# Patient Record
Sex: FEMALE | Race: WHITE | URBAN - METROPOLITAN AREA
[De-identification: names, ages, dates, MRNs, and addresses within clinical notes are randomized per-mention and may not be internally consistent; named-entity substitution may affect disease eponyms.]

---

## 2017-01-30 ENCOUNTER — TRANSFERRED RECORDS (OUTPATIENT)
Dept: HEALTH INFORMATION MANAGEMENT | Facility: CLINIC | Age: 72
End: 2017-01-30

## 2017-08-24 ENCOUNTER — TRANSFERRED RECORDS (OUTPATIENT)
Dept: HEALTH INFORMATION MANAGEMENT | Facility: CLINIC | Age: 72
End: 2017-08-24

## 2017-09-26 ENCOUNTER — PRE VISIT (OUTPATIENT)
Dept: OTOLARYNGOLOGY | Facility: CLINIC | Age: 72
End: 2017-09-26

## 2017-09-26 NOTE — TELEPHONE ENCOUNTER
1.  Date/reason for appt: 10/5/17 -- schwannoma    2.  Referring provider: Larry Su    3.  Call to patient (Yes / No - short description): no, referred    4.  Previous care at / records requested from: [facility] -- per appt notes, records received.  Need imaging. -- faxed imaging request

## 2017-09-27 NOTE — TELEPHONE ENCOUNTER
Radiology reports received from [facility]. Images pushed.  MR orbit face and neck 8/24/17  CT head 1/30/17

## 2017-09-29 ENCOUNTER — TELEPHONE (OUTPATIENT)
Dept: OTOLARYNGOLOGY | Facility: CLINIC | Age: 72
End: 2017-09-29

## 2017-09-29 NOTE — TELEPHONE ENCOUNTER
Dr. Blanchard has reviewed pt MRI. Because of the location of pt mass- right temporal, pt should see a head/neck physician not a neurologist. Please call ENT  Madeleine to coordinate new appointment.  The above message left on Francheska Quesada's voice mail.

## 2017-10-09 ENCOUNTER — OFFICE VISIT (OUTPATIENT)
Dept: OTOLARYNGOLOGY | Facility: CLINIC | Age: 72
End: 2017-10-09

## 2017-10-09 DIAGNOSIS — D36.10 SCHWANNOMA: Primary | ICD-10-CM

## 2017-10-09 RX ORDER — DIVALPROEX SODIUM 500 MG/1
TABLET, EXTENDED RELEASE ORAL
Status: ON HOLD | COMMUNITY
Start: 1994-01-01 | End: 2017-11-16

## 2017-10-09 RX ORDER — MONTELUKAST SODIUM 10 MG/1
TABLET ORAL
COMMUNITY
Start: 2007-01-01

## 2017-10-09 RX ORDER — DIVALPROEX SODIUM 250 MG/1
TABLET, DELAYED RELEASE ORAL
COMMUNITY
Start: 2010-01-01

## 2017-10-09 RX ORDER — DILTIAZEM HCL 60 MG
120 TABLET ORAL DAILY
COMMUNITY
Start: 2007-01-01

## 2017-10-09 RX ORDER — GABAPENTIN 300 MG/1
900 CAPSULE ORAL AT BEDTIME
COMMUNITY
Start: 1994-01-01

## 2017-10-09 RX ORDER — MECLIZINE HCL 25MG 25 MG/1
TABLET, CHEWABLE ORAL
COMMUNITY
Start: 2015-01-01

## 2017-10-09 RX ORDER — GLIPIZIDE 10 MG/1
10 TABLET, FILM COATED, EXTENDED RELEASE ORAL DAILY
COMMUNITY
Start: 2000-01-01

## 2017-10-09 RX ORDER — METFORMIN HCL 500 MG
1000 TABLET, EXTENDED RELEASE 24 HR ORAL 2 TIMES DAILY WITH MEALS
COMMUNITY
Start: 1998-01-01

## 2017-10-09 RX ORDER — ATORVASTATIN CALCIUM 10 MG/1
TABLET, FILM COATED ORAL
COMMUNITY
Start: 1999-01-01

## 2017-10-09 RX ORDER — LORAZEPAM 0.5 MG/1
TABLET ORAL
COMMUNITY
Start: 2000-01-01

## 2017-10-09 RX ORDER — SERTRALINE HYDROCHLORIDE 100 MG/1
200 TABLET, FILM COATED ORAL DAILY
COMMUNITY
Start: 2000-01-01

## 2017-10-09 RX ORDER — OXYBUTYNIN CHLORIDE 10 MG/1
TABLET, EXTENDED RELEASE ORAL
COMMUNITY
Start: 2007-01-01

## 2017-10-09 RX ORDER — LOSARTAN POTASSIUM AND HYDROCHLOROTHIAZIDE 25; 100 MG/1; MG/1
TABLET ORAL
COMMUNITY
Start: 2000-01-01

## 2017-10-09 ASSESSMENT — PAIN SCALES - GENERAL: PAINLEVEL: NO PAIN (0)

## 2017-10-09 NOTE — PATIENT INSTRUCTIONS
Tumor board on Friday  You will be called with results and recommendations  Call me if ?'s arise [phone]  Kerry Haney RN

## 2017-10-09 NOTE — MR AVS SNAPSHOT
After Visit Summary   10/9/2017    Clarisa Bella    MRN: [id number]           Patient Information     Date Of Birth          1945        Visit Information        Provider Department      10/9/2017 8:30 AM Abdiel Akhtar MD [facility] Ear Nose and Throat        Today's Diagnoses     Schwannoma    -  1      Care Instructions    Tumor board on Friday  You will be called with results and recommendations  Call me if ?'s arise [phone]  Kerry Haney RN          Follow-ups after your visit        Who to contact     Please call your clinic at [phone] to:    Ask questions about your health    Make or cancel appointments    Discuss your medicines    Learn about your test results    Speak to your doctor   If you have compliments or concerns about an experience at your clinic, or if you wish to file a complaint, please contact [facility] Patient Relations at [phone] or email us at [email or web address]         Additional Information About Your Visit        Critical Linkshart Information     [employer] gives you secure access to your electronic health record. If you see a primary care provider, you can also send messages to your care team and make appointments. If you have questions, please call your primary care clinic.  If you do not have a primary care provider, please call [phone] and they will assist you.      [employer] is an electronic gateway that provides easy, online access to your medical records. With [employer], you can request a clinic appointment, read your test results, renew a prescription or communicate with your care team.     To access your existing account, please contact your [facility] or call [phone] for assistance.        Care EveryWhere ID     This is your Care EveryWhere ID. This could be used by other organizations to access your [place] medical records  [id number]         Blood Pressure from Last 3  Encounters:   No data found for BP    Weight from Last 3 Encounters:   No data found for Wt              We Performed the Following     Eryn-Operative Worksheet (Head & Neck)        Primary Care Provider    None Specified       No primary provider on file.        Equal Access to Services     ANJALI AYALA : Hadkacey juan estrada erik Rosario, waashlynda lucarmen, qaybta kayamelda michele, cynthia landerosevonne russell. So [facility] [phone].    ATENCIÓN: Si habla español, tiene a rao disposición servicios gratuitos de asistencia lingüística. Llame al [phone].    We comply with applicable federal civil rights laws and [place] laws. We do not discriminate on the basis of race, color, national origin, age, disability, sex, sexual orientation, or gender identity.            Thank you!     Thank you for choosing [facility] EAR NOSE AND THROAT  for your care. Our goal is always to provide you with excellent care. Hearing back from our patients is one way we can continue to improve our services. Please take a few minutes to complete the written survey that you may receive in the mail after your visit with us. Thank you!             Your Updated Medication List - Protect others around you: Learn how to safely use, store and throw away your medicines at www.disposemymeds.org.          This list is accurate as of: 10/9/17 11:59 PM.  Always use your most recent med list.                   Brand Name Dispense Instructions for use Diagnosis    atorvastatin 10 MG tablet    LIPITOR          BYDUREON 2 MG pen   Generic drug:  exenatide ER           canagliflozin 300 MG tablet    INVOKANA          diltiazem 60 MG tablet    CARDIZEM          * divalproex 500 MG 24 hr tablet    DEPAKOTE ER          * divalproex 250 MG EC tablet    DEPAKOTE          glipiZIDE XL 10 MG 24 hr tablet   Generic drug:  glipiZIDE           LORazepam 0.5 MG tablet    ATIVAN          losartan-hydrochlorothiazide 100-25 MG per tablet    HYZAAR           meclizine 25 MG Chew           metFORMIN 500 MG 24 hr tablet    GLUCOPHAGE-XR          montelukast 10 MG tablet    SINGULAIR          NEURONTIN 300 MG capsule   Generic drug:  gabapentin           oxybutynin 10 MG 24 hr tablet    DITROPAN-XL          sertraline 100 MG tablet    ZOLOFT          * Notice:  This list has 2 medication(s) that are the same as other medications prescribed for you. Read the directions carefully, and ask your doctor or other care provider to review them with you.

## 2017-10-09 NOTE — LETTER
"10/9/2017       RE: Clarisa Bella  [address]  [facility] [address]     Dear Colleague,    Thank you for referring your patient, Clarisa Bella, to the [facility] EAR NOSE AND THROAT at [facility]. Please see a copy of my visit note below.    October 9, 2017         Reji Su MD   [facility]   [address] [place]   [address]      Dear Dr. Su:      Thank you for requesting consultation on Clarisa Bella.      HISTORY OF PRESENT ILLNESS:  As you know, she is a woman who noticed swelling in her right temporal area over the last year or so.  Initial needle biopsy was nondiagnostic and then she underwent open biopsy which revealed a schwannoma.  MRI imaging reveals it is lateral to the lateral orbital wall sitting in the space deep to the temporalis muscle.  The patient does have a general ache in that area, and it makes it a little difficult for her to put her glasses on because of the bulge, but she denies any numbness of her face, forehead, tongue, lips or cheek.  She has no dry eye either.  It is therefore not exactly clear which nerve this is emanating from.  She also has no facial weakness.             Past Medical History:   Diagnosis Date     Benign positional vertigo 2015     Cerebral infarction (H) 1998    small stroke during back fusion surgery due to blood loss     Diabetes (H) 1997     Hypertension 1997     Problem, psychiatric 1988    Bi Polar, PTSD     Skin disease 1997    Viteligo     Uncomplicated asthma LIfetime     Past Surgical History:   Procedure Laterality Date     GENITOURINARY SURGERY  1999    Bladder \"re-strung\" @ same time as Hysterectomy     GYN SURGERY  1999    Hysterectomy     ORTHOPEDIC SURGERY  1997    Back Fusion of L4 & L5       Current Outpatient Prescriptions:      atorvastatin (LIPITOR) 10 MG tablet, , Disp: , Rfl:      exenatide ER (BYDUREON) 2 MG pen, , Disp: , Rfl:      canagliflozin (INVOKANA) 300 MG tablet, , " Disp: , Rfl:      diltiazem (CARDIZEM) 60 MG tablet, , Disp: , Rfl:      divalproex (DEPAKOTE) 250 MG EC tablet, , Disp: , Rfl:      divalproex (DEPAKOTE ER) 500 MG 24 hr tablet, , Disp: , Rfl:      glipiZIDE (GLIPIZIDE XL) 10 MG 24 hr tablet, , Disp: , Rfl:      LORazepam (ATIVAN) 0.5 MG tablet, , Disp: , Rfl:      losartan-hydrochlorothiazide (HYZAAR) 100-25 MG per tablet, , Disp: , Rfl:      meclizine 25 MG CHEW, , Disp: , Rfl:      metFORMIN (GLUCOPHAGE-XR) 500 MG 24 hr tablet, , Disp: , Rfl:      montelukast (SINGULAIR) 10 MG tablet, , Disp: , Rfl:      gabapentin (NEURONTIN) 300 MG capsule, , Disp: , Rfl:      oxybutynin (DITROPAN-XL) 10 MG 24 hr tablet, , Disp: , Rfl:      sertraline (ZOLOFT) 100 MG tablet, , Disp: , Rfl:   No Known Allergies  Social History   Substance Use Topics     Smoking status: Never Smoker     Smokeless tobacco: Never Used     Alcohol use No     Review Of Systems  Skin: negative  Eyes: negative  Ears/Nose/Throat: negative  Respiratory: No shortness of breath, dyspnea on exertion, cough, or hemoptysis  Cardiovascular: negative  Gastrointestinal: negative  Genitourinary: negative  Musculoskeletal: negative  Neurologic: negative  Psychiatric: negative  Hematologic/Lymphatic/Immunologic: negative  Endocrine: negative    PHYSICAL EXAMINATION:  She is well appearing and in no distress.  Examination of the oral cavity reveals normal mucosa of the tongue, floor of mouth, hard and soft palate, gingival and buccal mucosa, retromolar trigone and posterior pharyngeal wall.  Palpation of floor of mouth, tongue and base of tongue are negative.  She cannot tolerate mirror exam.  Examination of the face and neck reveals a soft tissue swelling between the zygoma and the lateral orbital wall.  She has no facial nerve abnormalities.  Her facial nerve is entirely normal throughout.  Her sensation in V1, V2, V3 seems to be entirely normal.  Her extraocular motion is normal as well.  The mass itself reveals  a scar from the prior biopsy, and there is also another scar in the preauricular area which appears to have been from some trauma years ago.      IMAGING:  The patient has a CT scan and MRI that shows a well-circumscribed mass in the temporal fossa deep to the temporalis muscle abutting the skull base and lateral orbital wall.      IMPRESSION:  Right temporal fossa schwannoma.      PLAN:   1.  I would like to have the slides reviewed here.   2.  I would like to review her scans at our Tumor Conference.   3.  I discussed excision with the patient which would involve a hairline incision coming down into the preauricular area.  She understands that she would need facial nerve monitoring, and the branches to the forehead and the eye are certainly at risk.  We will do our best to save these but given that the mass is near some of the very distal aspects of the nerve, there certainly is risk to these distal branches.  She understands that if that was to happen, she could even require a gold weight for eyelid closure if this was not functioning after surgery.  She wishes to proceed.  All risks were discussed with the patient which include but are not limited to bleeding, infection and facial nerve injury as well as numbness in the face.      Sincerely,      Abdiel Akhtar M.D.        Otolaryngology-Head & Neck Surgery    [phone]

## 2017-10-09 NOTE — NURSING NOTE
Relevant Diagnosis:mass   Teaching Topic: exc right temporal fossa mass      Person(s) involved in teaching:  Patient     Teaching Concerns Addressed:  Pre op teaching included the need for an H&P, NPO status pre op, hospital routines, expected recovery, activity  restrictions, antimicrobial scrub, s/s of infection, pain control methods and the importance of follow up appointments.  The patient voiced an understanding of all instructions and will call with questions.     Motivation Level:  Asks Questions:   Yes  Eager to Learn:   Yes  Cooperative:   Yes  Receptive (willing/able to accept information):   Yes     Patient  demonstrates understanding of the following:  Reason for the appointment, diagnosis and treatment plan:   Yes  Knowledge of proper use of medications and conditions for which they are ordered (with special attention to potential side effects or drug interactions):   Yes  Which situations necessitate calling provider and whom to contact:   Yes        Proper use and care of  (medical equip, care aids, etc.):   NA  Nutritional needs and diet plan:   Yes  Pain management techniques:   Yes  Patient instructed on hand hygiene:  Yes  How and/when to access community resources:   NA     Infection Prevention:  Patient   demonstrates understanding of the following:  Surgical procedure site care taught   Signs and symptoms of infection taught Yes  Wound care taught Yes     Instructional Materials Used/Given: Pre op booklet.

## 2017-10-09 NOTE — PROGRESS NOTES
"October 9, 2017         Reji Su MD   [facility]   [address] [place]   [address]      Dear Dr. Su:      Thank you for requesting consultation on Clarisa Bella.      HISTORY OF PRESENT ILLNESS:  As you know, she is a woman who noticed swelling in her right temporal area over the last year or so.  Initial needle biopsy was nondiagnostic and then she underwent open biopsy which revealed a schwannoma.  MRI imaging reveals it is lateral to the lateral orbital wall sitting in the space deep to the temporalis muscle.  The patient does have a general ache in that area, and it makes it a little difficult for her to put her glasses on because of the bulge, but she denies any numbness of her face, forehead, tongue, lips or cheek.  She has no dry eye either.  It is therefore not exactly clear which nerve this is emanating from.  She also has no facial weakness.             Past Medical History:   Diagnosis Date     Benign positional vertigo 2015     Cerebral infarction (H) 1998    small stroke during back fusion surgery due to blood loss     Diabetes (H) 1997     Hypertension 1997     Problem, psychiatric 1988    Bi Polar, PTSD     Skin disease 1997    Viteligo     Uncomplicated asthma LIfetime     Past Surgical History:   Procedure Laterality Date     GENITOURINARY SURGERY  1999    Bladder \"re-strung\" @ same time as Hysterectomy     GYN SURGERY  1999    Hysterectomy     ORTHOPEDIC SURGERY  1997    Back Fusion of L4 & L5       Current Outpatient Prescriptions:      atorvastatin (LIPITOR) 10 MG tablet, , Disp: , Rfl:      exenatide ER (BYDUREON) 2 MG pen, , Disp: , Rfl:      canagliflozin (INVOKANA) 300 MG tablet, , Disp: , Rfl:      diltiazem (CARDIZEM) 60 MG tablet, , Disp: , Rfl:      divalproex (DEPAKOTE) 250 MG EC tablet, , Disp: , Rfl:      divalproex (DEPAKOTE ER) 500 MG 24 hr tablet, , Disp: , Rfl:      glipiZIDE (GLIPIZIDE XL) 10 MG 24 hr tablet, , Disp: , Rfl:      LORazepam (ATIVAN) " 0.5 MG tablet, , Disp: , Rfl:      losartan-hydrochlorothiazide (HYZAAR) 100-25 MG per tablet, , Disp: , Rfl:      meclizine 25 MG CHEW, , Disp: , Rfl:      metFORMIN (GLUCOPHAGE-XR) 500 MG 24 hr tablet, , Disp: , Rfl:      montelukast (SINGULAIR) 10 MG tablet, , Disp: , Rfl:      gabapentin (NEURONTIN) 300 MG capsule, , Disp: , Rfl:      oxybutynin (DITROPAN-XL) 10 MG 24 hr tablet, , Disp: , Rfl:      sertraline (ZOLOFT) 100 MG tablet, , Disp: , Rfl:   No Known Allergies  Social History   Substance Use Topics     Smoking status: Never Smoker     Smokeless tobacco: Never Used     Alcohol use No     Review Of Systems  Skin: negative  Eyes: negative  Ears/Nose/Throat: negative  Respiratory: No shortness of breath, dyspnea on exertion, cough, or hemoptysis  Cardiovascular: negative  Gastrointestinal: negative  Genitourinary: negative  Musculoskeletal: negative  Neurologic: negative  Psychiatric: negative  Hematologic/Lymphatic/Immunologic: negative  Endocrine: negative    PHYSICAL EXAMINATION:  She is well appearing and in no distress.  Examination of the oral cavity reveals normal mucosa of the tongue, floor of mouth, hard and soft palate, gingival and buccal mucosa, retromolar trigone and posterior pharyngeal wall.  Palpation of floor of mouth, tongue and base of tongue are negative.  She cannot tolerate mirror exam.  Examination of the face and neck reveals a soft tissue swelling between the zygoma and the lateral orbital wall.  She has no facial nerve abnormalities.  Her facial nerve is entirely normal throughout.  Her sensation in V1, V2, V3 seems to be entirely normal.  Her extraocular motion is normal as well.  The mass itself reveals a scar from the prior biopsy, and there is also another scar in the preauricular area which appears to have been from some trauma years ago.      IMAGING:  The patient has a CT scan and MRI that shows a well-circumscribed mass in the temporal fossa deep to the temporalis muscle  abutting the skull base and lateral orbital wall.      IMPRESSION:  Right temporal fossa schwannoma.      PLAN:   1.  I would like to have the slides reviewed here.   2.  I would like to review her scans at our Tumor Conference.   3.  I discussed excision with the patient which would involve a hairline incision coming down into the preauricular area.  She understands that she would need facial nerve monitoring, and the branches to the forehead and the eye are certainly at risk.  We will do our best to save these but given that the mass is near some of the very distal aspects of the nerve, there certainly is risk to these distal branches.  She understands that if that was to happen, she could even require a gold weight for eyelid closure if this was not functioning after surgery.  She wishes to proceed.  All risks were discussed with the patient which include but are not limited to bleeding, infection and facial nerve injury as well as numbness in the face.      Sincerely,      Abdiel Akhtar M.D.        Otolaryngology-Head & Neck Surgery    [phone]

## 2017-10-09 NOTE — NURSING NOTE
Chief Complaint   Patient presents with     Consult     soft tissue mass right temple . Unable to obtain height and weight. Wheel chair bound     Christiano Grey LPN

## 2017-11-15 ENCOUNTER — ANESTHESIA EVENT (OUTPATIENT)
Dept: SURGERY | Facility: CLINIC | Age: 72
End: 2017-11-15
Payer: MEDICARE

## 2017-11-15 NOTE — ANESTHESIA PREPROCEDURE EVALUATION
"  Anesthesia Evaluation     .             ROS/MED HX    ENT/Pulmonary:     (+)asthma , . .    Neurologic:     (+)neuropathy CVA     Cardiovascular:     (+) hypertension----. : . . . :. .       METS/Exercise Tolerance:     Hematologic:  - neg hematologic  ROS       Musculoskeletal:  - neg musculoskeletal ROS      Muscular Dystrophy: 6.6.   GI/Hepatic:  - neg GI/hepatic ROS       Renal/Genitourinary:         Endo:     (+) type I DM, type II DM Not using insulin Obesity, .      Psychiatric:     (+) psychiatric history       Infectious Disease:         Malignancy:         Other:    (+) No chance of pregnancy                    Physical Exam  Normal systems: cardiovascular, pulmonary and dental    Airway   Mallampati: II  TM distance: >3 FB  Neck ROM: full    Dental     Cardiovascular       Pulmonary                     Anesthesia Plan      History & Physical Review      ASA Status:  3 .        Plan for General and ETT with Intravenous induction. Maintenance will be Balanced.    PONV prophylaxis:  Ondansetron (or other 5HT-3) and Dexamethasone or Solumedrol  Additional equipment: 2nd IV and Arterial Line      Postoperative Care  Postoperative pain management:  IV analgesics and Oral pain medications.      Consents          ANESTHESIA PREOP EVALUATION    Procedure: Procedure(s):  Excision Right Temporal Fossa Mass - Wound Class:     HPI: Clraisa Bella is a 72 year old female presenting for above procedure d/t schwannoma    PMHx/PSHx/ROS:  Past Medical History:   Diagnosis Date     Benign positional vertigo 2015     Cerebral infarction (H) 1998    small stroke during back fusion surgery due to blood loss     Diabetes (H) 1997     History of blood transfusion      Hypertension 1997     Problem, psychiatric 1988    Bi Polar, PTSD     Skin disease 1997    Viteligo     Uncomplicated asthma LIfetime       Past Surgical History:   Procedure Laterality Date     GENITOURINARY SURGERY  1999    Bladder \"re-strung\" @ same time as " Hysterectomy     GYN SURGERY  1999    Hysterectomy     ORTHOPEDIC SURGERY  1997    Back Fusion of L4 & L5         Past Anes Hx: No personal or family h/o anesthesia problems    Soc Hx:   Social History   Substance Use Topics     Smoking status: Never Smoker     Smokeless tobacco: Never Used     Alcohol use No       Allergies: No Known Allergies    Meds:   No prescriptions prior to admission.       Current Outpatient Prescriptions   Medication Sig Dispense Refill     atorvastatin (LIPITOR) 10 MG tablet        exenatide ER (BYDUREON) 2 MG pen        canagliflozin (INVOKANA) 300 MG tablet        diltiazem (CARDIZEM) 60 MG tablet        divalproex (DEPAKOTE) 250 MG EC tablet        divalproex (DEPAKOTE ER) 500 MG 24 hr tablet        glipiZIDE (GLIPIZIDE XL) 10 MG 24 hr tablet        LORazepam (ATIVAN) 0.5 MG tablet        losartan-hydrochlorothiazide (HYZAAR) 100-25 MG per tablet        meclizine 25 MG CHEW        metFORMIN (GLUCOPHAGE-XR) 500 MG 24 hr tablet        montelukast (SINGULAIR) 10 MG tablet        gabapentin (NEURONTIN) 300 MG capsule        oxybutynin (DITROPAN-XL) 10 MG 24 hr tablet        sertraline (ZOLOFT) 100 MG tablet          Physical Exam:  Vitals: There were no vitals taken for this visit.  BMI= There is no height or weight on file to calculate BMI.      Labs:  UPT: No results found for: HCGQUANT      BMP:  No results for input(s): NA, POTASSIUM, CHLORIDE, CO2, BUN, CR, GLC, MARLEN in the last 62490 hours.  CBC:   No results for input(s): WBC, RBC, HGB, HCT, MCV, MCH, MCHC, RDW, PLT in the last 21143 hours.  Coags:  No results for input(s): INR, PTT, FIBR in the last 66551 hours.    Assessment/Plan:  - ASA 3  - GETA with standard ASA monitors, IV induction, balanced anesthetic  - PIVx2  - Art line  - Antibiotics per surgery  - PONV prophylaxis  - Blood products available, possible administration discussed with patient  - Relevant risks, benefits, alternatives and the anesthetic plan were discussed  with patient/family or family representative.  All questions were answered and there was agreement to proceed.      Faisal Lazar DO, MSc. (CA-1)    11/15/2017.  10:16 AM

## 2017-11-16 ENCOUNTER — SURGERY (OUTPATIENT)
Age: 72
End: 2017-11-16

## 2017-11-16 ENCOUNTER — ANESTHESIA (OUTPATIENT)
Dept: SURGERY | Facility: CLINIC | Age: 72
End: 2017-11-16
Payer: MEDICARE

## 2017-11-16 ENCOUNTER — HOSPITAL ENCOUNTER (OUTPATIENT)
Facility: CLINIC | Age: 72
Setting detail: OBSERVATION
Discharge: HOME | End: 2017-11-17
Attending: OTOLARYNGOLOGY | Admitting: OTOLARYNGOLOGY
Payer: MEDICARE

## 2017-11-16 DIAGNOSIS — K59.03 DRUG-INDUCED CONSTIPATION: ICD-10-CM

## 2017-11-16 DIAGNOSIS — Z98.890 POSTOPERATIVE STATE: ICD-10-CM

## 2017-11-16 DIAGNOSIS — G89.18 ACUTE POST-OPERATIVE PAIN: Primary | ICD-10-CM

## 2017-11-16 PROBLEM — K11.8 PAROTID MASS: Status: ACTIVE | Noted: 2017-11-16

## 2017-11-16 LAB
GLUCOSE BLDC GLUCOMTR-MCNC: 120 MG/DL (ref 70–99)
GLUCOSE BLDC GLUCOMTR-MCNC: 154 MG/DL (ref 70–99)
GLUCOSE BLDC GLUCOMTR-MCNC: 157 MG/DL (ref 70–99)
GLUCOSE BLDC GLUCOMTR-MCNC: 167 MG/DL (ref 70–99)

## 2017-11-16 PROCEDURE — 25000128 H RX IP 250 OP 636: Performed by: STUDENT IN AN ORGANIZED HEALTH CARE EDUCATION/TRAINING PROGRAM

## 2017-11-16 PROCEDURE — A9270 NON-COVERED ITEM OR SERVICE: HCPCS | Mod: GY | Performed by: OTOLARYNGOLOGY

## 2017-11-16 PROCEDURE — 25000125 ZZHC RX 250: Performed by: OTOLARYNGOLOGY

## 2017-11-16 PROCEDURE — 25000566 ZZH SEVOFLURANE, EA 15 MIN: Performed by: OTOLARYNGOLOGY

## 2017-11-16 PROCEDURE — 25000128 H RX IP 250 OP 636: Performed by: OTOLARYNGOLOGY

## 2017-11-16 PROCEDURE — A9270 NON-COVERED ITEM OR SERVICE: HCPCS | Mod: GY | Performed by: STUDENT IN AN ORGANIZED HEALTH CARE EDUCATION/TRAINING PROGRAM

## 2017-11-16 PROCEDURE — 88307 TISSUE EXAM BY PATHOLOGIST: CPT | Performed by: OTOLARYNGOLOGY

## 2017-11-16 PROCEDURE — G0378 HOSPITAL OBSERVATION PER HR: HCPCS

## 2017-11-16 PROCEDURE — 71000013 ZZH RECOVERY PHASE 1 LEVEL 1 EA ADDTL HR: Performed by: OTOLARYNGOLOGY

## 2017-11-16 PROCEDURE — 25000132 ZZH RX MED GY IP 250 OP 250 PS 637: Mod: GY | Performed by: OTOLARYNGOLOGY

## 2017-11-16 PROCEDURE — 36000051 ZZH SURGERY LEVEL 2 1ST 30 MIN - UMMC: Performed by: OTOLARYNGOLOGY

## 2017-11-16 PROCEDURE — 36000053 ZZH SURGERY LEVEL 2 EA 15 ADDTL MIN - UMMC: Performed by: OTOLARYNGOLOGY

## 2017-11-16 PROCEDURE — 27210794 ZZH OR GENERAL SUPPLY STERILE: Performed by: OTOLARYNGOLOGY

## 2017-11-16 PROCEDURE — 71000012 ZZH RECOVERY PHASE 1 LEVEL 1 FIRST HR: Performed by: OTOLARYNGOLOGY

## 2017-11-16 PROCEDURE — 25000132 ZZH RX MED GY IP 250 OP 250 PS 637: Mod: GY | Performed by: STUDENT IN AN ORGANIZED HEALTH CARE EDUCATION/TRAINING PROGRAM

## 2017-11-16 PROCEDURE — 37000008 ZZH ANESTHESIA TECHNICAL FEE, 1ST 30 MIN: Performed by: OTOLARYNGOLOGY

## 2017-11-16 PROCEDURE — 40000170 ZZH STATISTIC PRE-PROCEDURE ASSESSMENT II: Performed by: OTOLARYNGOLOGY

## 2017-11-16 PROCEDURE — 25000125 ZZHC RX 250: Performed by: STUDENT IN AN ORGANIZED HEALTH CARE EDUCATION/TRAINING PROGRAM

## 2017-11-16 PROCEDURE — 00000146 ZZHCL STATISTIC GLUCOSE BY METER IP

## 2017-11-16 PROCEDURE — 37000009 ZZH ANESTHESIA TECHNICAL FEE, EACH ADDTL 15 MIN: Performed by: OTOLARYNGOLOGY

## 2017-11-16 PROCEDURE — 88342 IMHCHEM/IMCYTCHM 1ST ANTB: CPT | Performed by: OTOLARYNGOLOGY

## 2017-11-16 PROCEDURE — 88305 TISSUE EXAM BY PATHOLOGIST: CPT | Performed by: OTOLARYNGOLOGY

## 2017-11-16 PROCEDURE — 40000802 ZZH SITE CHECK

## 2017-11-16 RX ORDER — ONDANSETRON 2 MG/ML
4 INJECTION INTRAMUSCULAR; INTRAVENOUS EVERY 6 HOURS PRN
Status: DISCONTINUED | OUTPATIENT
Start: 2017-11-16 | End: 2017-11-17 | Stop reason: HOSPADM

## 2017-11-16 RX ORDER — SODIUM CHLORIDE, SODIUM LACTATE, POTASSIUM CHLORIDE, CALCIUM CHLORIDE 600; 310; 30; 20 MG/100ML; MG/100ML; MG/100ML; MG/100ML
INJECTION, SOLUTION INTRAVENOUS CONTINUOUS
Status: DISCONTINUED | OUTPATIENT
Start: 2017-11-16 | End: 2017-11-16 | Stop reason: HOSPADM

## 2017-11-16 RX ORDER — DILTIAZEM HYDROCHLORIDE 120 MG/1
120 TABLET, FILM COATED ORAL EVERY MORNING
Status: DISCONTINUED | OUTPATIENT
Start: 2017-11-16 | End: 2017-11-17 | Stop reason: HOSPADM

## 2017-11-16 RX ORDER — SODIUM CHLORIDE 9 MG/ML
INJECTION, SOLUTION INTRAVENOUS CONTINUOUS
Status: DISCONTINUED | OUTPATIENT
Start: 2017-11-16 | End: 2017-11-17

## 2017-11-16 RX ORDER — ALUMINA, MAGNESIA, AND SIMETHICONE 2400; 2400; 240 MG/30ML; MG/30ML; MG/30ML
30 SUSPENSION ORAL EVERY 4 HOURS PRN
Status: DISCONTINUED | OUTPATIENT
Start: 2017-11-16 | End: 2017-11-17 | Stop reason: HOSPADM

## 2017-11-16 RX ORDER — HYDROMORPHONE HYDROCHLORIDE 1 MG/ML
.3-.5 INJECTION, SOLUTION INTRAMUSCULAR; INTRAVENOUS; SUBCUTANEOUS
Status: DISCONTINUED | OUTPATIENT
Start: 2017-11-16 | End: 2017-11-17

## 2017-11-16 RX ORDER — NICOTINE POLACRILEX 4 MG
15-30 LOZENGE BUCCAL
Status: DISCONTINUED | OUTPATIENT
Start: 2017-11-16 | End: 2017-11-17 | Stop reason: HOSPADM

## 2017-11-16 RX ORDER — AMOXICILLIN 250 MG
1 CAPSULE ORAL AT BEDTIME
Status: DISCONTINUED | OUTPATIENT
Start: 2017-11-16 | End: 2017-11-17 | Stop reason: HOSPADM

## 2017-11-16 RX ORDER — LORAZEPAM 0.5 MG/1
0.5 TABLET ORAL AT BEDTIME
Status: DISCONTINUED | OUTPATIENT
Start: 2017-11-16 | End: 2017-11-17 | Stop reason: HOSPADM

## 2017-11-16 RX ORDER — DEXAMETHASONE SODIUM PHOSPHATE 4 MG/ML
INJECTION, SOLUTION INTRA-ARTICULAR; INTRALESIONAL; INTRAMUSCULAR; INTRAVENOUS; SOFT TISSUE PRN
Status: DISCONTINUED | OUTPATIENT
Start: 2017-11-16 | End: 2017-11-16

## 2017-11-16 RX ORDER — HYDROMORPHONE HYDROCHLORIDE 1 MG/ML
.3-.5 INJECTION, SOLUTION INTRAMUSCULAR; INTRAVENOUS; SUBCUTANEOUS EVERY 10 MIN PRN
Status: DISCONTINUED | OUTPATIENT
Start: 2017-11-16 | End: 2017-11-16 | Stop reason: HOSPADM

## 2017-11-16 RX ORDER — FENTANYL CITRATE 50 UG/ML
25-50 INJECTION, SOLUTION INTRAMUSCULAR; INTRAVENOUS
Status: DISCONTINUED | OUTPATIENT
Start: 2017-11-16 | End: 2017-11-16 | Stop reason: HOSPADM

## 2017-11-16 RX ORDER — DIVALPROEX SODIUM 500 MG/1
500 TABLET, EXTENDED RELEASE ORAL AT BEDTIME
Status: DISCONTINUED | OUTPATIENT
Start: 2017-11-16 | End: 2017-11-16

## 2017-11-16 RX ORDER — OXYBUTYNIN CHLORIDE 10 MG/1
10 TABLET, EXTENDED RELEASE ORAL AT BEDTIME
Status: DISCONTINUED | OUTPATIENT
Start: 2017-11-16 | End: 2017-11-17 | Stop reason: HOSPADM

## 2017-11-16 RX ORDER — CEFAZOLIN SODIUM 2 G/100ML
2 INJECTION, SOLUTION INTRAVENOUS
Status: COMPLETED | OUTPATIENT
Start: 2017-11-16 | End: 2017-11-16

## 2017-11-16 RX ORDER — GLYCOPYRROLATE 0.2 MG/ML
INJECTION, SOLUTION INTRAMUSCULAR; INTRAVENOUS PRN
Status: DISCONTINUED | OUTPATIENT
Start: 2017-11-16 | End: 2017-11-16

## 2017-11-16 RX ORDER — MECLIZINE HYDROCHLORIDE 25 MG/1
25 TABLET ORAL 3 TIMES DAILY PRN
Status: DISCONTINUED | OUTPATIENT
Start: 2017-11-16 | End: 2017-11-17 | Stop reason: HOSPADM

## 2017-11-16 RX ORDER — ACETAMINOPHEN 325 MG/1
650 TABLET ORAL EVERY 6 HOURS PRN
Status: DISCONTINUED | OUTPATIENT
Start: 2017-11-16 | End: 2017-11-17 | Stop reason: HOSPADM

## 2017-11-16 RX ORDER — LIDOCAINE HYDROCHLORIDE 20 MG/ML
INJECTION, SOLUTION INFILTRATION; PERINEURAL PRN
Status: DISCONTINUED | OUTPATIENT
Start: 2017-11-16 | End: 2017-11-16

## 2017-11-16 RX ORDER — PROCHLORPERAZINE MALEATE 5 MG
5 TABLET ORAL EVERY 6 HOURS PRN
Status: DISCONTINUED | OUTPATIENT
Start: 2017-11-16 | End: 2017-11-17 | Stop reason: HOSPADM

## 2017-11-16 RX ORDER — DIVALPROEX SODIUM 500 MG/1
500 TABLET, DELAYED RELEASE ORAL AT BEDTIME
Status: DISCONTINUED | OUTPATIENT
Start: 2017-11-16 | End: 2017-11-17 | Stop reason: HOSPADM

## 2017-11-16 RX ORDER — NALOXONE HYDROCHLORIDE 0.4 MG/ML
.1-.4 INJECTION, SOLUTION INTRAMUSCULAR; INTRAVENOUS; SUBCUTANEOUS
Status: DISCONTINUED | OUTPATIENT
Start: 2017-11-16 | End: 2017-11-16 | Stop reason: HOSPADM

## 2017-11-16 RX ORDER — SODIUM CHLORIDE, SODIUM LACTATE, POTASSIUM CHLORIDE, CALCIUM CHLORIDE 600; 310; 30; 20 MG/100ML; MG/100ML; MG/100ML; MG/100ML
INJECTION, SOLUTION INTRAVENOUS CONTINUOUS PRN
Status: DISCONTINUED | OUTPATIENT
Start: 2017-11-16 | End: 2017-11-16

## 2017-11-16 RX ORDER — GABAPENTIN 300 MG/1
900 CAPSULE ORAL AT BEDTIME
Status: DISCONTINUED | OUTPATIENT
Start: 2017-11-16 | End: 2017-11-17 | Stop reason: HOSPADM

## 2017-11-16 RX ORDER — MEPERIDINE HYDROCHLORIDE 25 MG/ML
12.5 INJECTION INTRAMUSCULAR; INTRAVENOUS; SUBCUTANEOUS
Status: DISCONTINUED | OUTPATIENT
Start: 2017-11-16 | End: 2017-11-16 | Stop reason: HOSPADM

## 2017-11-16 RX ORDER — LABETALOL HYDROCHLORIDE 5 MG/ML
10 INJECTION, SOLUTION INTRAVENOUS
Status: COMPLETED | OUTPATIENT
Start: 2017-11-16 | End: 2017-11-16

## 2017-11-16 RX ORDER — ONDANSETRON 4 MG/1
4 TABLET, ORALLY DISINTEGRATING ORAL EVERY 30 MIN PRN
Status: DISCONTINUED | OUTPATIENT
Start: 2017-11-16 | End: 2017-11-16 | Stop reason: HOSPADM

## 2017-11-16 RX ORDER — ACETAMINOPHEN 325 MG/1
975 TABLET ORAL ONCE
Status: COMPLETED | OUTPATIENT
Start: 2017-11-16 | End: 2017-11-16

## 2017-11-16 RX ORDER — EPHEDRINE SULFATE 50 MG/ML
INJECTION, SOLUTION INTRAMUSCULAR; INTRAVENOUS; SUBCUTANEOUS PRN
Status: DISCONTINUED | OUTPATIENT
Start: 2017-11-16 | End: 2017-11-16

## 2017-11-16 RX ORDER — DIVALPROEX SODIUM 250 MG/1
250 TABLET, DELAYED RELEASE ORAL EVERY MORNING
Status: DISCONTINUED | OUTPATIENT
Start: 2017-11-17 | End: 2017-11-17 | Stop reason: HOSPADM

## 2017-11-16 RX ORDER — ONDANSETRON 2 MG/ML
4 INJECTION INTRAMUSCULAR; INTRAVENOUS EVERY 30 MIN PRN
Status: DISCONTINUED | OUTPATIENT
Start: 2017-11-16 | End: 2017-11-16 | Stop reason: HOSPADM

## 2017-11-16 RX ORDER — BACITRACIN ZINC 500 [USP'U]/G
OINTMENT TOPICAL 3 TIMES DAILY
Status: DISCONTINUED | OUTPATIENT
Start: 2017-11-16 | End: 2017-11-17 | Stop reason: HOSPADM

## 2017-11-16 RX ORDER — LOSARTAN POTASSIUM AND HYDROCHLOROTHIAZIDE 25; 100 MG/1; MG/1
1 TABLET ORAL DAILY
Status: DISCONTINUED | OUTPATIENT
Start: 2017-11-16 | End: 2017-11-17 | Stop reason: HOSPADM

## 2017-11-16 RX ORDER — ONDANSETRON 4 MG/1
4 TABLET, ORALLY DISINTEGRATING ORAL EVERY 6 HOURS PRN
Status: DISCONTINUED | OUTPATIENT
Start: 2017-11-16 | End: 2017-11-17 | Stop reason: HOSPADM

## 2017-11-16 RX ORDER — ATORVASTATIN CALCIUM 10 MG/1
10 TABLET, FILM COATED ORAL DAILY
Status: DISCONTINUED | OUTPATIENT
Start: 2017-11-17 | End: 2017-11-17 | Stop reason: HOSPADM

## 2017-11-16 RX ORDER — NALOXONE HYDROCHLORIDE 0.4 MG/ML
.1-.4 INJECTION, SOLUTION INTRAMUSCULAR; INTRAVENOUS; SUBCUTANEOUS
Status: DISCONTINUED | OUTPATIENT
Start: 2017-11-16 | End: 2017-11-17 | Stop reason: HOSPADM

## 2017-11-16 RX ORDER — OXYCODONE HYDROCHLORIDE 5 MG/1
5 TABLET ORAL
Status: DISCONTINUED | OUTPATIENT
Start: 2017-11-16 | End: 2017-11-17 | Stop reason: HOSPADM

## 2017-11-16 RX ORDER — ONDANSETRON 2 MG/ML
INJECTION INTRAMUSCULAR; INTRAVENOUS PRN
Status: DISCONTINUED | OUTPATIENT
Start: 2017-11-16 | End: 2017-11-16

## 2017-11-16 RX ORDER — DEXTROSE MONOHYDRATE 25 G/50ML
25-50 INJECTION, SOLUTION INTRAVENOUS
Status: DISCONTINUED | OUTPATIENT
Start: 2017-11-16 | End: 2017-11-17 | Stop reason: HOSPADM

## 2017-11-16 RX ORDER — DIVALPROEX SODIUM 500 MG/1
500 TABLET, DELAYED RELEASE ORAL EVERY EVENING
COMMUNITY

## 2017-11-16 RX ORDER — METOCLOPRAMIDE HYDROCHLORIDE 5 MG/ML
5 INJECTION INTRAMUSCULAR; INTRAVENOUS EVERY 6 HOURS PRN
Status: DISCONTINUED | OUTPATIENT
Start: 2017-11-16 | End: 2017-11-17 | Stop reason: HOSPADM

## 2017-11-16 RX ORDER — DILTIAZEM HCL 60 MG
60 TABLET ORAL EVERY MORNING
Status: DISCONTINUED | OUTPATIENT
Start: 2017-11-16 | End: 2017-11-16

## 2017-11-16 RX ORDER — PROPOFOL 10 MG/ML
INJECTION, EMULSION INTRAVENOUS PRN
Status: DISCONTINUED | OUTPATIENT
Start: 2017-11-16 | End: 2017-11-16

## 2017-11-16 RX ORDER — MONTELUKAST SODIUM 10 MG/1
10 TABLET ORAL EVERY EVENING
Status: DISCONTINUED | OUTPATIENT
Start: 2017-11-16 | End: 2017-11-17 | Stop reason: HOSPADM

## 2017-11-16 RX ORDER — METOCLOPRAMIDE 5 MG/1
5 TABLET ORAL EVERY 6 HOURS PRN
Status: DISCONTINUED | OUTPATIENT
Start: 2017-11-16 | End: 2017-11-17 | Stop reason: HOSPADM

## 2017-11-16 RX ORDER — GABAPENTIN 300 MG/1
300 CAPSULE ORAL AT BEDTIME
Status: DISCONTINUED | OUTPATIENT
Start: 2017-11-16 | End: 2017-11-16

## 2017-11-16 RX ORDER — FENTANYL CITRATE 50 UG/ML
INJECTION, SOLUTION INTRAMUSCULAR; INTRAVENOUS PRN
Status: DISCONTINUED | OUTPATIENT
Start: 2017-11-16 | End: 2017-11-16

## 2017-11-16 RX ORDER — CEFAZOLIN SODIUM 1 G/3ML
INJECTION, POWDER, FOR SOLUTION INTRAMUSCULAR; INTRAVENOUS PRN
Status: DISCONTINUED | OUTPATIENT
Start: 2017-11-16 | End: 2017-11-16

## 2017-11-16 RX ADMIN — MONTELUKAST SODIUM 10 MG: 10 TABLET, FILM COATED ORAL at 20:07

## 2017-11-16 RX ADMIN — HYDROMORPHONE HYDROCHLORIDE 0.2 MG: 1 INJECTION, SOLUTION INTRAMUSCULAR; INTRAVENOUS; SUBCUTANEOUS at 09:31

## 2017-11-16 RX ADMIN — PHENYLEPHRINE HYDROCHLORIDE 200 MCG: 10 INJECTION, SOLUTION INTRAMUSCULAR; INTRAVENOUS; SUBCUTANEOUS at 08:53

## 2017-11-16 RX ADMIN — LORAZEPAM 0.5 MG: 0.5 TABLET ORAL at 21:54

## 2017-11-16 RX ADMIN — Medication 5 MG: at 08:53

## 2017-11-16 RX ADMIN — BACITRACIN ZINC: 500 OINTMENT TOPICAL at 15:43

## 2017-11-16 RX ADMIN — Medication 10 MG: at 08:47

## 2017-11-16 RX ADMIN — DEXAMETHASONE SODIUM PHOSPHATE 6 MG: 4 INJECTION, SOLUTION INTRA-ARTICULAR; INTRALESIONAL; INTRAMUSCULAR; INTRAVENOUS; SOFT TISSUE at 08:35

## 2017-11-16 RX ADMIN — REMIFENTANIL HYDROCHLORIDE 0.02 MCG/KG/MIN: 1 INJECTION, POWDER, LYOPHILIZED, FOR SOLUTION INTRAVENOUS at 08:24

## 2017-11-16 RX ADMIN — HYDROMORPHONE HYDROCHLORIDE 0.5 MG: 1 INJECTION, SOLUTION INTRAMUSCULAR; INTRAVENOUS; SUBCUTANEOUS at 15:38

## 2017-11-16 RX ADMIN — CEFAZOLIN 1 G: 1 INJECTION, POWDER, FOR SOLUTION INTRAMUSCULAR; INTRAVENOUS at 09:23

## 2017-11-16 RX ADMIN — PHENYLEPHRINE HYDROCHLORIDE 50 MCG: 10 INJECTION, SOLUTION INTRAMUSCULAR; INTRAVENOUS; SUBCUTANEOUS at 09:31

## 2017-11-16 RX ADMIN — ACETAMINOPHEN 975 MG: 325 TABLET, FILM COATED ORAL at 06:20

## 2017-11-16 RX ADMIN — FENTANYL CITRATE 100 MCG: 50 INJECTION, SOLUTION INTRAMUSCULAR; INTRAVENOUS at 07:43

## 2017-11-16 RX ADMIN — PHENYLEPHRINE HYDROCHLORIDE 50 MCG: 10 INJECTION, SOLUTION INTRAMUSCULAR; INTRAVENOUS; SUBCUTANEOUS at 08:44

## 2017-11-16 RX ADMIN — CEFAZOLIN SODIUM 2 G: 2 INJECTION, SOLUTION INTRAVENOUS at 07:57

## 2017-11-16 RX ADMIN — HYDROMORPHONE HYDROCHLORIDE 0.5 MG: 1 INJECTION, SOLUTION INTRAMUSCULAR; INTRAVENOUS; SUBCUTANEOUS at 12:26

## 2017-11-16 RX ADMIN — BENZOCAINE AND MENTHOL 1 LOZENGE: 15; 3.6 LOZENGE ORAL at 21:54

## 2017-11-16 RX ADMIN — HYDROMORPHONE HYDROCHLORIDE 0.3 MG: 1 INJECTION, SOLUTION INTRAMUSCULAR; INTRAVENOUS; SUBCUTANEOUS at 11:05

## 2017-11-16 RX ADMIN — DILTIAZEM HYDROCHLORIDE 120 MG: 120 TABLET, FILM COATED ORAL at 15:59

## 2017-11-16 RX ADMIN — PHENYLEPHRINE HYDROCHLORIDE 150 MCG: 10 INJECTION, SOLUTION INTRAMUSCULAR; INTRAVENOUS; SUBCUTANEOUS at 08:47

## 2017-11-16 RX ADMIN — SENNOSIDES AND DOCUSATE SODIUM 1 TABLET: 8.6; 5 TABLET ORAL at 21:54

## 2017-11-16 RX ADMIN — DIVALPROEX SODIUM 500 MG: 500 TABLET, DELAYED RELEASE ORAL at 21:54

## 2017-11-16 RX ADMIN — FENTANYL CITRATE 50 MCG: 50 INJECTION, SOLUTION INTRAMUSCULAR; INTRAVENOUS at 10:21

## 2017-11-16 RX ADMIN — SODIUM CHLORIDE: 9 INJECTION, SOLUTION INTRAVENOUS at 11:19

## 2017-11-16 RX ADMIN — ONDANSETRON 4 MG: 2 INJECTION INTRAMUSCULAR; INTRAVENOUS at 09:41

## 2017-11-16 RX ADMIN — OXYCODONE HYDROCHLORIDE 5 MG: 5 TABLET ORAL at 17:58

## 2017-11-16 RX ADMIN — HYDROMORPHONE HYDROCHLORIDE 0.3 MG: 1 INJECTION, SOLUTION INTRAMUSCULAR; INTRAVENOUS; SUBCUTANEOUS at 10:19

## 2017-11-16 RX ADMIN — OXYCODONE HYDROCHLORIDE 5 MG: 5 TABLET ORAL at 21:13

## 2017-11-16 RX ADMIN — Medication 10 MG: at 08:44

## 2017-11-16 RX ADMIN — Medication 0.1 MG: at 08:53

## 2017-11-16 RX ADMIN — HYDROMORPHONE HYDROCHLORIDE 0.3 MG: 1 INJECTION, SOLUTION INTRAMUSCULAR; INTRAVENOUS; SUBCUTANEOUS at 11:27

## 2017-11-16 RX ADMIN — SODIUM CHLORIDE, POTASSIUM CHLORIDE, SODIUM LACTATE AND CALCIUM CHLORIDE: 600; 310; 30; 20 INJECTION, SOLUTION INTRAVENOUS at 07:15

## 2017-11-16 RX ADMIN — Medication 1000 MG: at 07:43

## 2017-11-16 RX ADMIN — LIDOCAINE HYDROCHLORIDE 100 MG: 20 INJECTION, SOLUTION INFILTRATION; PERINEURAL at 07:43

## 2017-11-16 RX ADMIN — LOSARTAN POTASSIUM AND HYDROCHLOROTHIAZIDE 1 TABLET: 100; 25 TABLET, FILM COATED ORAL at 15:59

## 2017-11-16 RX ADMIN — PHENYLEPHRINE HYDROCHLORIDE 50 MCG: 10 INJECTION, SOLUTION INTRAMUSCULAR; INTRAVENOUS; SUBCUTANEOUS at 08:49

## 2017-11-16 RX ADMIN — GABAPENTIN 900 MG: 300 CAPSULE ORAL at 21:54

## 2017-11-16 RX ADMIN — FENTANYL CITRATE 50 MCG: 50 INJECTION, SOLUTION INTRAMUSCULAR; INTRAVENOUS at 10:11

## 2017-11-16 RX ADMIN — EPINEPHRINE 5 ML: 1 INJECTION INTRAMUSCULAR; INTRAVENOUS; SUBCUTANEOUS at 07:56

## 2017-11-16 RX ADMIN — PHENYLEPHRINE HYDROCHLORIDE 100 MCG: 10 INJECTION, SOLUTION INTRAMUSCULAR; INTRAVENOUS; SUBCUTANEOUS at 09:26

## 2017-11-16 RX ADMIN — PROPOFOL 20 MG: 10 INJECTION, EMULSION INTRAVENOUS at 08:00

## 2017-11-16 RX ADMIN — PROPOFOL 150 MG: 10 INJECTION, EMULSION INTRAVENOUS at 07:43

## 2017-11-16 RX ADMIN — Medication 0.1 MG: at 08:47

## 2017-11-16 RX ADMIN — OXYBUTYNIN CHLORIDE 10 MG: 10 TABLET, FILM COATED, EXTENDED RELEASE ORAL at 21:54

## 2017-11-16 RX ADMIN — BACITRACIN ZINC: 500 OINTMENT TOPICAL at 20:07

## 2017-11-16 RX ADMIN — ROCURONIUM BROMIDE 10 MG: 10 INJECTION INTRAVENOUS at 07:43

## 2017-11-16 RX ADMIN — SODIUM CHLORIDE, POTASSIUM CHLORIDE, SODIUM LACTATE AND CALCIUM CHLORIDE: 600; 310; 30; 20 INJECTION, SOLUTION INTRAVENOUS at 08:00

## 2017-11-16 RX ADMIN — Medication 10 MG: at 10:50

## 2017-11-16 ASSESSMENT — PAIN DESCRIPTION - DESCRIPTORS
DESCRIPTORS: PATIENT UNABLE TO DESCRIBE
DESCRIPTORS: ACHING
DESCRIPTORS: PATIENT UNABLE TO DESCRIBE
DESCRIPTORS: PATIENT UNABLE TO DESCRIBE

## 2017-11-16 ASSESSMENT — ACTIVITIES OF DAILY LIVING (ADL)
RETIRED_EATING: 0-->INDEPENDENT
FALL_HISTORY_WITHIN_LAST_SIX_MONTHS: NO
AMBULATION: 1-->ASSISTIVE EQUIPMENT
RETIRED_COMMUNICATION: 0-->UNDERSTANDS/COMMUNICATES WITHOUT DIFFICULTY
TRANSFERRING: 1-->ASSISTIVE EQUIPMENT
SWALLOWING: 0-->SWALLOWS FOODS/LIQUIDS WITHOUT DIFFICULTY
COGNITION: 0 - NO COGNITION ISSUES REPORTED
TOILETING: 0-->INDEPENDENT
BATHING: 0-->INDEPENDENT
DRESS: 0-->INDEPENDENT

## 2017-11-16 NOTE — PLAN OF CARE
Problem: Patient Care Overview  Goal: Plan of Care/Patient Progress Review  Observation Goals:  -Vital signs at baseline: Stable except for O2  -Able to tolerate oral intake: Yes  -Pain controlled on PO meds: No, received dilaudid

## 2017-11-16 NOTE — PLAN OF CARE
Problem: Patient Care Overview  Goal: Plan of Care/Patient Progress Review  Observation Goals:  -Vital signs at baseline: Stable except for O2  -Able to tolerate oral intake: Yes  -Pain controlled on PO meds: No, received dilaudid x1     A&Ox4. VSS on 2L O2. No n/v. Pain at incision site, received dilaudid 0.5 IVP once. Incision site wnl, dried blood, no drainage. 2 PIVs R SL, L running NS 100ml/hr. Pt voided in toilet (unmeasured, missed the hat) with assist of 1/SBA using own cane. Capno wnl. Continue to monitor.

## 2017-11-16 NOTE — PROGRESS NOTES
ENT Brief Note    Patient failed recovery due to intractable pain requiring IV pain meds, will admit to obs due to this.    Hellen Newman  ENT PGY#5

## 2017-11-16 NOTE — ANESTHESIA CARE TRANSFER NOTE
Patient: Clarisa Bella    Procedure(s):  Excision Right Temporal Fossa Mass - Wound Class: I-Clean    Diagnosis: Schwannoma  Diagnosis Additional Information: No value filed.    Anesthesia Type:   General, ETT     Note:  Airway :Face Mask  Patient transferred to:PACU  Comments: VSS. Pt conversational, c/o pain in transit to PACU on 6L via face mask.Handoff Report: Identifed the Patient, Identified the Reponsible Provider, Reviewed the pertinent medical history, Discussed the surgical course, Reviewed Intra-OP anesthesia mangement and issues during anesthesia, Set expectations for post-procedure period and Allowed opportunity for questions and acknowledgement of understanding      Vitals: (Last set prior to Anesthesia Care Transfer)    CRNA VITALS  11/16/2017 0931 - 11/16/2017 1014      11/16/2017             Resp Rate (observed): (!)  2                Electronically Signed By: Faisal Lazar DO  November 16, 2017  10:14 AM

## 2017-11-16 NOTE — ANESTHESIA POSTPROCEDURE EVALUATION
Patient: Clarisa Bella    Procedure(s):  Excision Right Temporal Fossa Mass - Wound Class: I-Clean    Diagnosis:Schwannoma  Diagnosis Additional Information: No value filed.    Anesthesia Type:  General, ETT    Note:  Anesthesia Post Evaluation    Patient location during evaluation: PACU  Patient participation: Able to fully participate in evaluation  Level of consciousness: awake and alert  Pain management: adequate  Airway patency: patent  Cardiovascular status: acceptable  Respiratory status: acceptable  Hydration status: acceptable  PONV: none             Last vitals:  Vitals:    11/16/17 1100 11/16/17 1115 11/16/17 1130   BP: 132/71 134/67 134/67   Pulse: 94 93 94   Resp: 16 16 16   Temp: 36.8  C (98.3  F)  36.4  C (97.6  F)   SpO2: 94% 94% 94%         Electronically Signed By: Cy Kendrick MD  November 16, 2017  11:54 AM

## 2017-11-16 NOTE — BRIEF OP NOTE
[facility], [place]    Brief Operative Note    Pre-operative diagnosis: Schwannoma  Post-operative diagnosis same  Procedure: Procedure(s):  Excision Right Temporal Fossa Mass - Wound Class: I-Clean  Surgeon: Surgeon(s) and Role:     * Abdiel Akhtar MD - Primary     * Hellen Winter MD - Resident - Assisting  Anesthesia: General   Estimated blood loss: 20 ml  Drains: None  Specimens:   ID Type Source Tests Collected by Time Destination   A : right temporal fossa mass Tissue Other SURGICAL PATHOLOGY EXAM Abdiel Akhtar MD 11/16/2017  9:22 AM    B : right facial scar Tissue Other SURGICAL PATHOLOGY EXAM Abdiel Akhtar MD 11/16/2017  9:23 AM      Findings:   Mass in infratemporal fossa  Complications: None.  Implants: None.

## 2017-11-16 NOTE — IP AVS SNAPSHOT
MRN:[id number]                      After Visit Summary   11/16/2017    Clarisa Bella    MRN: [id number]           Thank you!     Thank you for choosing [facility] for your care. Our goal is always to provide you with excellent care. Hearing back from our patients is one way we can continue to improve our services. Please take a few minutes to complete the written survey that you may receive in the mail after you visit with us. Thank you!        Patient Information     Date Of Birth          1945        Designated Caregiver       Most Recent Value    Caregiver    Will someone help with your care after discharge? yes    Name of designated caregiver assisted living staff    Phone number of caregiver see facesheet    Caregiver address see facesheet      About your hospital stay     You were admitted on:  November 16, 2017 You last received care in the:  Unit [address] Observation [facility]    You were discharged on:  November 17, 2017        Reason for your hospital stay       Post-operative care and pain control                  Who to Call     For medical emergencies, please call 911.  For non-urgent questions about your medical care, please call your primary care provider or clinic, [phone]  For questions related to your surgery, please call your surgery clinic        Attending Provider     Provider Abdiel Wang MD Otolaryngology       Primary Care Provider Office Phone # Fax #    Caryn Grey [phone]       When to contact your care team       No heavy lifting greater than 10 lbs and no strenuous exercise for 2 weeks or until follow up appointment. No driving while taking narcotic pain medications.                  After Care Instructions     Activity       Your activity upon discharge: No heavy lifting greater than 10 lbs and no strenuous exercise for 2 weeks or until follow up appointment. No driving while taking narcotic pain medications.  "           Diet       Follow this diet upon discharge: Regular diet            Wound care and dressings       Instructions to care for your wound at home: Keep incisions clean and dry. Apply Aquaphor ointment to incisions three times daily to keep moist. You may shower, do not soak, scrub, or submerge incisions under water.                  Follow-up Appointments     Adult [facility] Follow-up and recommended labs and tests       Follow up in ENT clinic with Dr. Akhtar on Monday, November 27th at 11:15AM. Please call the clinic with questions/concerns: [phone].    Otolaryngology/ENT Clinic:  [facility]  Clinics & Surgery Center  [address] [place] [address]    Appointments on [place] and/or [facility] (with [facility] or [facility] provider or service). Call [phone] if you haven't heard regarding these appointments within 7 days of discharge.                  Your next 10 appointments already scheduled     Nov 27, 2017 11:15 AM CST   (Arrive by 11:00 AM)   Return Visit with Abdiel Akhtar MD   [facility] Ear Nose and Throat ([facility])    [address] [place] [address]  4th [facility] [address] [phone]              Pending Results     Date and Time Order Name Status Description    11/16/2017 0923 Surgical pathology exam In process             Statement of Approval     Ordered          11/17/17 1002  I have reviewed and agree with all the recommendations and orders detailed in this document.  EFFECTIVE NOW     Approved and electronically signed by:  Brianne Shepherd PA-C             Admission Information     Date & Time Provider Department Dept. Phone    11/16/2017 Abdiel Akhtar MD Unit 6D Observation [facility] [place] [phone]      Your Vitals Were     Blood Pressure Pulse Temperature Respirations Height Weight    143/78 (BP Location: Left arm) 90 97.8  F (36.6  C) (Oral) 16 1.676 m (5' 6\") 116.7 kg (257 lb 4.4 oz)    " Pulse Oximetry BMI (Body Mass Index)                92% 41.53 kg/m2          [employer] Information     [employer] gives you secure access to your electronic health record. If you see a primary care provider, you can also send messages to your care team and make appointments. If you have questions, please call your primary care clinic.  If you do not have a primary care provider, please call [phone] and they will assist you.        Care EveryWhere ID     This is your Care EveryWhere ID. This could be used by other organizations to access your [facility] medical records  [id number]        Equal Access to Services     [facility]: Hadkacey Rosario, sheree sands, ramiro bautista, cynthia lopez . So [facility] [phone].    ATENCIÓN: Si habla español, tiene a rao disposición servicios gratuitos de asistencia lingüística. [facility] [phone].    We comply with applicable federal civil rights laws and [place] laws. We do not discriminate on the basis of race, color, national origin, age, disability, sex, sexual orientation, or gender identity.               Review of your medicines      START taking        Dose / Directions    acetaminophen 325 MG tablet   Commonly known as:  TYLENOL   Used for:  Acute post-operative pain        Dose:  650 mg   Take 2 tablets (650 mg) by mouth every 6 hours as needed for mild pain   Quantity:  100 tablet   Refills:  0       mineral oil-hydrophilic petrolatum   Used for:  Postoperative state        Dose:  1 g   Apply 1 g topically 3 times daily Apply to incision.   Quantity:  50 g   Refills:  0       oxyCODONE IR 5 MG tablet   Commonly known as:  ROXICODONE   Used for:  Acute post-operative pain        Dose:  5 mg   Take 1 tablet (5 mg) by mouth every 3 hours as needed for moderate to severe pain   Quantity:  30 tablet   Refills:  0       senna-docusate 8.6-50 MG per tablet   Commonly known as:  SENOKOT-S;PERICOLACE   Used for:   Drug-induced constipation        Dose:  1 tablet   Take 1 tablet by mouth At Bedtime   Quantity:  30 tablet   Refills:  0         CONTINUE these medicines which have NOT CHANGED        Dose / Directions    atorvastatin 10 MG tablet   Commonly known as:  LIPITOR        Refills:  0       BYDUREON 2 MG pen   Generic drug:  exenatide ER        Refills:  0       canagliflozin 300 MG tablet   Commonly known as:  INVOKANA        Refills:  0       diltiazem 60 MG tablet   Commonly known as:  CARDIZEM        Dose:  120 mg   120 mg daily   Refills:  0       * divalproex 500 MG EC tablet   Commonly known as:  DEPAKOTE        Dose:  500 mg   Take 500 mg by mouth every evening   Refills:  0       * divalproex 250 MG EC tablet   Commonly known as:  DEPAKOTE        Refills:  0       glipiZIDE XL 10 MG 24 hr tablet   Generic drug:  glipiZIDE        Dose:  10 mg   Take 10 mg by mouth daily   Refills:  0       LORazepam 0.5 MG tablet   Commonly known as:  ATIVAN        Refills:  0       losartan-hydrochlorothiazide 100-25 MG per tablet   Commonly known as:  HYZAAR        Refills:  0       meclizine 25 MG Chew        Refills:  0       metFORMIN 500 MG 24 hr tablet   Commonly known as:  GLUCOPHAGE-XR        Dose:  1000 mg   1,000 mg 2 times daily (with meals)   Refills:  0       montelukast 10 MG tablet   Commonly known as:  SINGULAIR        Refills:  0       NEURONTIN 300 MG capsule   Generic drug:  gabapentin        Dose:  900 mg   Take 900 mg by mouth At Bedtime   Refills:  0       oxybutynin 10 MG 24 hr tablet   Commonly known as:  DITROPAN-XL        Refills:  0       sertraline 100 MG tablet   Commonly known as:  ZOLOFT        Dose:  200 mg   200 mg daily   Refills:  0       * Notice:  This list has 2 medication(s) that are the same as other medications prescribed for you. Read the directions carefully, and ask your doctor or other care provider to review them with you.         Where to get your medicines      These medications  were sent to [facility] - [place], [place] - [address]  [address], [facility] [address]     Phone:  [phone]     acetaminophen 325 MG tablet    mineral oil-hydrophilic petrolatum    senna-docusate 8.6-50 MG per tablet         Some of these will need a paper prescription and others can be bought over the counter. Ask your nurse if you have questions.     Bring a paper prescription for each of these medications     oxyCODONE IR 5 MG tablet                Protect others around you: Learn how to safely use, store and throw away your medicines at www.disposemymeds.org.             Medication List: This is a list of all your medications and when to take them. Check marks below indicate your daily home schedule. Keep this list as a reference.      Medications           Morning Afternoon Evening Bedtime As Needed    acetaminophen 325 MG tablet   Commonly known as:  TYLENOL   Take 2 tablets (650 mg) by mouth every 6 hours as needed for mild pain   Last time this was given:  650 mg on 11/17/2017  8:34 AM                                atorvastatin 10 MG tablet   Commonly known as:  LIPITOR   Last time this was given:  10 mg on 11/17/2017  8:23 AM                                BYDUREON 2 MG pen   Generic drug:  exenatide ER                                canagliflozin 300 MG tablet   Commonly known as:  INVOKANA                                diltiazem 60 MG tablet   Commonly known as:  CARDIZEM   120 mg daily   Last time this was given:  120 mg on 11/17/2017  8:24 AM                                * divalproex 500 MG EC tablet   Commonly known as:  DEPAKOTE   Take 500 mg by mouth every evening   Last time this was given:  250 mg on 11/17/2017  8:24 AM                                * divalproex 250 MG EC tablet   Commonly known as:  DEPAKOTE   Last time this was given:  250 mg on 11/17/2017  8:24 AM                                glipiZIDE XL 10 MG 24 hr tablet   Take 10 mg by mouth  daily   Generic drug:  glipiZIDE                                LORazepam 0.5 MG tablet   Commonly known as:  ATIVAN   Last time this was given:  0.5 mg on 11/16/2017  9:54 PM                                losartan-hydrochlorothiazide 100-25 MG per tablet   Commonly known as:  HYZAAR   Last time this was given:  1 tablet on 11/17/2017  8:24 AM                                meclizine 25 MG Chew                                metFORMIN 500 MG 24 hr tablet   Commonly known as:  GLUCOPHAGE-XR   1,000 mg 2 times daily (with meals)                                mineral oil-hydrophilic petrolatum   Apply 1 g topically 3 times daily Apply to incision.                                montelukast 10 MG tablet   Commonly known as:  SINGULAIR   Last time this was given:  10 mg on 11/16/2017  8:07 PM                                NEURONTIN 300 MG capsule   Take 900 mg by mouth At Bedtime   Last time this was given:  900 mg on 11/16/2017  9:54 PM   Generic drug:  gabapentin                                oxybutynin 10 MG 24 hr tablet   Commonly known as:  DITROPAN-XL   Last time this was given:  10 mg on 11/16/2017  9:54 PM                                oxyCODONE IR 5 MG tablet   Commonly known as:  ROXICODONE   Take 1 tablet (5 mg) by mouth every 3 hours as needed for moderate to severe pain   Last time this was given:  5 mg on 11/17/2017  8:34 AM                                senna-docusate 8.6-50 MG per tablet   Commonly known as:  SENOKOT-S;PERICOLACE   Take 1 tablet by mouth At Bedtime   Last time this was given:  1 tablet on 11/16/2017  9:54 PM                                sertraline 100 MG tablet   Commonly known as:  ZOLOFT   200 mg daily   Last time this was given:  200 mg on 11/17/2017  8:23 AM                                * Notice:  This list has 2 medication(s) that are the same as other medications prescribed for you. Read the directions carefully, and ask your doctor or other care provider to review them  with you.

## 2017-11-17 VITALS
WEIGHT: 257.28 LBS | HEIGHT: 66 IN | SYSTOLIC BLOOD PRESSURE: 143 MMHG | BODY MASS INDEX: 41.35 KG/M2 | HEART RATE: 90 BPM | OXYGEN SATURATION: 92 % | RESPIRATION RATE: 16 BRPM | DIASTOLIC BLOOD PRESSURE: 78 MMHG | TEMPERATURE: 97.8 F

## 2017-11-17 LAB — HBA1C MFR BLD: 7 % (ref 4.3–6)

## 2017-11-17 PROCEDURE — G0378 HOSPITAL OBSERVATION PER HR: HCPCS

## 2017-11-17 PROCEDURE — 25000125 ZZHC RX 250: Performed by: OTOLARYNGOLOGY

## 2017-11-17 PROCEDURE — 36415 COLL VENOUS BLD VENIPUNCTURE: CPT | Performed by: OTOLARYNGOLOGY

## 2017-11-17 PROCEDURE — 83036 HEMOGLOBIN GLYCOSYLATED A1C: CPT | Performed by: OTOLARYNGOLOGY

## 2017-11-17 PROCEDURE — 25000132 ZZH RX MED GY IP 250 OP 250 PS 637: Mod: GY | Performed by: OTOLARYNGOLOGY

## 2017-11-17 PROCEDURE — A9270 NON-COVERED ITEM OR SERVICE: HCPCS | Mod: GY | Performed by: OTOLARYNGOLOGY

## 2017-11-17 RX ORDER — AMOXICILLIN 250 MG
1 CAPSULE ORAL AT BEDTIME
Qty: 30 TABLET | Refills: 0 | Status: SHIPPED | OUTPATIENT
Start: 2017-11-17

## 2017-11-17 RX ORDER — MINERAL OIL/HYDROPHIL PETROLAT
1 OINTMENT (GRAM) TOPICAL 3 TIMES DAILY
Qty: 50 G | Refills: 0 | Status: SHIPPED | OUTPATIENT
Start: 2017-11-17

## 2017-11-17 RX ORDER — OXYCODONE HYDROCHLORIDE 5 MG/1
5 TABLET ORAL
Qty: 30 TABLET | Refills: 0 | Status: SHIPPED | OUTPATIENT
Start: 2017-11-17

## 2017-11-17 RX ORDER — ACETAMINOPHEN 325 MG/1
650 TABLET ORAL EVERY 6 HOURS PRN
Qty: 100 TABLET | Refills: 0 | Status: SHIPPED | OUTPATIENT
Start: 2017-11-17

## 2017-11-17 RX ADMIN — DILTIAZEM HYDROCHLORIDE 120 MG: 120 TABLET, FILM COATED ORAL at 08:24

## 2017-11-17 RX ADMIN — ATORVASTATIN CALCIUM 10 MG: 10 TABLET, FILM COATED ORAL at 08:23

## 2017-11-17 RX ADMIN — OXYCODONE HYDROCHLORIDE 5 MG: 5 TABLET ORAL at 08:34

## 2017-11-17 RX ADMIN — ACETAMINOPHEN 650 MG: 325 TABLET, FILM COATED ORAL at 08:34

## 2017-11-17 RX ADMIN — BACITRACIN ZINC: 500 OINTMENT TOPICAL at 08:27

## 2017-11-17 RX ADMIN — OXYCODONE HYDROCHLORIDE 5 MG: 5 TABLET ORAL at 00:25

## 2017-11-17 RX ADMIN — LOSARTAN POTASSIUM AND HYDROCHLOROTHIAZIDE 1 TABLET: 100; 25 TABLET, FILM COATED ORAL at 08:24

## 2017-11-17 RX ADMIN — SERTRALINE HYDROCHLORIDE 200 MG: 50 TABLET ORAL at 08:23

## 2017-11-17 RX ADMIN — DIVALPROEX SODIUM 250 MG: 250 TABLET, DELAYED RELEASE ORAL at 08:24

## 2017-11-17 ASSESSMENT — PAIN DESCRIPTION - DESCRIPTORS
DESCRIPTORS: ACHING

## 2017-11-17 NOTE — PLAN OF CARE
Problem: Patient Care Overview  Goal: Plan of Care/Patient Progress Review  Outpatient/Observation goals to be met before discharge home:  Observation Goals:  -Vital signs at baseline: Stable except for O2, still at 2 li  -Able to tolerate oral intake: Yes  -Pain controlled on PO meds: denies pain, claimed Oxycodone is working help control the pain

## 2017-11-17 NOTE — PROGRESS NOTES
Discharge instructions reviewed, understood, and signed by patient. VSS, PIV removed, medications reviewed and understood, patient has all belongings. Patient will leave unit via wheelchair with family.

## 2017-11-17 NOTE — PLAN OF CARE
Problem: Patient Care Overview  Goal: Plan of Care/Patient Progress Review  Outpatient/Observation goals to be met before discharge home:  Observation Goals:  -Vital signs at baseline: Stable except for O2, still at 2 li  -Able to tolerate oral intake: Yes  -Pain controlled on PO meds: Yes

## 2017-11-17 NOTE — DISCHARGE SUMMARY
Discharge Summary  Clarisa Bella  [id number]  1945    Date of Admission: 11/16/2017  Date of Discharge:     Admission Diagnosis: Schwannoma  Parotid mass  Discharge Diagnosis: Same    Procedures:  Date: 11/16/2017  Procedure(s):  EXCISE MASS HEAD    Pathology: pending    HPI: Clarisa Bella is a 72 year old female with history of DM and HTN who developed swelling in her right temporal area found to be a schwannoma deep to the temporalis muscle. It was recommended that she undergo operative intervention and the patient consented to the above procedure after detailed explanation of the risks and benefits of said procedure.    Hospital Course: The patient was admitted to the hospital and underwent the above mentioned procedure. She tolerated the procedure without any intra- or lilliam-operative complications. Please see the operative report for full details of the procedure. The patient was admitted for post-operative monitoring. Her postoperative course was complicated by uncontrolled pain requiring IV pain medications. She was kept in the hospital overnight for observation. At discharge, the patient's pain was well controlled on oral medications, the patient was voiding on her own, and was ambulating and tolerating a regular diet.     Discharge Exam:  Vitals:    11/16/17 2000 11/16/17 2158 11/16/17 2201 11/17/17 0738   BP:  132/76 123/76 143/78   BP Location:    Left arm   Pulse:       Resp:   16 16   Temp:   97.9  F (36.6  C) 97.8  F (36.6  C)   TempSrc:   Oral Oral   SpO2: 92% 94%  92%   Weight:       Height:           General: A&O x 3, No acute distress  HEENT: EOMI. Right temporal incision is clean, dry, and intact; no bleeding no drainage, no surrounding fluctuance. Right forehead weakness with no perceptible motion, remainder of the facial nerve intact and functional, full passive eye closure. Voice raspy but strong.   Respiratory: Breathing non-labored on room air, no stridor, no accessory muscle use.      Discharge Medications:   Clarisa Bella   Home Medication Instructions DANIEL:[id number]    Printed on:11/17/17 1002   Medication Information                      acetaminophen (TYLENOL) 325 MG tablet  Take 2 tablets (650 mg) by mouth every 6 hours as needed for mild pain             atorvastatin (LIPITOR) 10 MG tablet               canagliflozin (INVOKANA) 300 MG tablet               diltiazem (CARDIZEM) 60 MG tablet  120 mg daily              divalproex (DEPAKOTE) 250 MG EC tablet               divalproex (DEPAKOTE) 500 MG EC tablet  Take 500 mg by mouth every evening             exenatide ER (BYDUREON) 2 MG pen               gabapentin (NEURONTIN) 300 MG capsule  Take 900 mg by mouth At Bedtime              glipiZIDE (GLIPIZIDE XL) 10 MG 24 hr tablet  Take 10 mg by mouth daily              LORazepam (ATIVAN) 0.5 MG tablet               losartan-hydrochlorothiazide (HYZAAR) 100-25 MG per tablet               meclizine 25 MG CHEW               metFORMIN (GLUCOPHAGE-XR) 500 MG 24 hr tablet  1,000 mg 2 times daily (with meals)              mineral oil-hydrophilic petrolatum (AQUAPHOR)  Apply 1 g topically 3 times daily Apply to incision.             montelukast (SINGULAIR) 10 MG tablet               oxybutynin (DITROPAN-XL) 10 MG 24 hr tablet               oxyCODONE IR (ROXICODONE) 5 MG tablet  Take 1 tablet (5 mg) by mouth every 3 hours as needed for moderate to severe pain             senna-docusate (SENOKOT-S;PERICOLACE) 8.6-50 MG per tablet  Take 1 tablet by mouth At Bedtime             sertraline (ZOLOFT) 100 MG tablet  200 mg daily                    Discharge Procedure Orders  Reason for your hospital stay   Order Comments: Post-operative care and pain control     Adult [facility] Follow-up and recommended labs and tests   Order Comments: Follow up in ENT clinic with Dr. Akhtar on Monday, November 27th at 11:15AM. Please call the clinic with questions/concerns: [phone].    Otolaryngology/ENT  Clinic:  [facility]  Clinics & Surgery Center  [address] [place] [address]    Appointments on [place] and/or [facility] (with [facility] or [facility] provider or service). Call [phone] if you haven't heard regarding these appointments within 7 days of discharge.     Activity   Order Comments: Your activity upon discharge: No heavy lifting greater than 10 lbs and no strenuous exercise for 2 weeks or until follow up appointment. No driving while taking narcotic pain medications.   Order Specific Question Answer Comments   Is discharge order? Yes      When to contact your care team   Order Comments: No heavy lifting greater than 10 lbs and no strenuous exercise for 2 weeks or until follow up appointment. No driving while taking narcotic pain medications.     Wound care and dressings   Order Comments: Instructions to care for your wound at home: Keep incisions clean and dry. Apply Aquaphor ointment to incisions three times daily to keep moist. You may shower, do not soak, scrub, or submerge incisions under water.     Full Code     Diet   Order Comments: Follow this diet upon discharge: Regular diet   Order Specific Question Answer Comments   Is discharge order? Yes          Dispo: To home in good condition. All of the patient's questions/concerns have been addressed at this time.     Brianne Shepherd PA-C  Otolaryngology-Head & Neck Surgery  Please contact ENT by dialing [phone] and entering job code [phone].

## 2017-11-17 NOTE — PLAN OF CARE
Problem: Patient Care Overview  Goal: Plan of Care/Patient Progress Review  Outpatient/Observation goals to be met before discharge home:    -Vital signs at baseline: yes   -Able to tolerate oral intake: yes   -Pain controlled on PO meds: yes

## 2017-11-17 NOTE — PLAN OF CARE
Problem: Patient Care Overview  Goal: Plan of Care/Patient Progress Review  Outpatient/Observation goals to be met before discharge home:  -Vital signs at baseline: Stable except for O2, still at 2 li  -Able to tolerate oral intake: Yes  -Pain controlled on PO meds: Yes

## 2017-11-18 NOTE — OP NOTE
DATE OF SURGERY:  11/16/2017.      STAFF SURGEON:  Abdiel Akhtar MD      RESIDENT SURGEON:  Hellen Newman MD      PREOPERATIVE DIAGNOSIS:  Right infratemporal mass.      POSTOPERATIVE DIAGNOSIS:  Right infratemporal mass.      PROCEDURE:  Excision of right infratemporal mass.      INDICATIONS FOR PROCEDURE:  Clarisa Bella is a 72-year-old female with a mass in the right temporal area.  This has been slowly growing.  Patient has undergone a needle biopsy that was nondiagnostic and then at an outside hospital she also underwent an open biopsy which revealed a schwannoma.  Imaging was obtained that showed that the mass is well circumscribed and is in the temporal fossa deep to the temporalis muscle, abutting the skull base and lateral orbital wall.  Given this finding, risks and benefits of surgery were discussed at length with the patient and she elected to proceed with surgery and consent was obtained.      ANESTHESIA:  General endotracheal.      FINDINGS:  Well-circumscribed mass in the temporal fossa deep to the temporalis muscle that was abutting skull base and lateral orbital wall.  The patient did have a scar tissue from a previous open biopsy that tracked out to the mass.      ESTIMATED BLOOD LOSS:  20 mL.      SPECIMENS:  Right temporal fossa mass and right facial scar.      COMPLICATIONS:  None.      CONDITION AT THE END OF SURGERY:  Stable.      DESCRIPTION OF PROCEDURE:  The patient was brought to the operating room and laid supine on the operating table.  General anesthesia was induced by the anesthesia team and patient was orotracheally intubated.  The bed was then turned 180 degrees.  Facial nerve monitors were then placed in the right frontalis, orbicularis oculi and orbicularis oris and was tap tested.  Impedances were checked and everything was noted to be in good working condition and the facial nerve monitor was used for the rest of the case.  The patient was then prepped and  draped in the usual sterile fashion.  A timeout was conducted, identifying patient and procedure, and all were in agreement.  An incision along the hairline coming down to the preauricular area was marked and injected with 1% lidocaine with 1:100,000 epinephrine.  A 15 blade was then used to make this incision.  Of note, we did ellipse out skin from the previous open biopsy site, as we were concerned that the biopsy may have seeded that tract.  We then began to raise skin flaps anteriorly and posteriorly.  We then identified temporalis muscle and divided this and started to go deep to muscle and deep to the superficial fat pad that we could protect the frontal branch of the facial nerve.  A probe was used to ensure that we did not inadvertently damage the nerve.  Bipolar cautery was used for hemostasis and we continued our blunt dissection in this region and we identified the mass.  We continued to dissect around the mass and deep to it and we were able to free the mass.  The mass was then handed off to be sent to pathology for permanent section.  The wound was then irrigated and suctioned out and bleeding was controlled with the use of bipolar cautery.  Fibrillar was then placed in the wound and subcutaneous tissue was then closed with interrupted 3-0 Vicryl in the deep dermal layer.  Skin was then closed using a 4-0 Monocryl in a subcuticular fashion.  The incision was then dressed with bacitracin.  This completed the procedure.  The patient was handed back over to Anesthesia, awoken, extubated and taken to the PACU in stable condition.      Dr. Hu was present for the entire procedure.         ABDIEL HU MD       As dictated by HERIBERTO ROSEN MD       I was present for the entire procedure  Abdiel Hu M.D.       D: 2017 11:48   T: 2017 23:00   MT:       Name:     MILEY MELENDEZ   MRN:      [id number]        Account:        [id number]   :      1945            Procedure Date: 11/16/2017      Document: [id number]

## 2017-11-22 LAB — COPATH REPORT: NORMAL

## 2017-11-29 ENCOUNTER — CARE COORDINATION (OUTPATIENT)
Dept: OTOLARYNGOLOGY | Facility: CLINIC | Age: 72
End: 2017-11-29

## 2017-11-29 NOTE — PROGRESS NOTES
I spoke today with both Clarisa's nurse at the assisted living and her daughter Sangita   Her incision has healed nicely.  She is having issues with the flu and now pneumonia   She does not want to come down for a post op appointment.  Sangita was given the pathology results and will relay the info to her mother.  They want to cancel the follow up appointment for Monday  Sangita will call if problems arise.

## 2020-03-11 ENCOUNTER — HEALTH MAINTENANCE LETTER (OUTPATIENT)
Age: 75
End: 2020-03-11

## 2020-12-27 ENCOUNTER — HEALTH MAINTENANCE LETTER (OUTPATIENT)
Age: 75
End: 2020-12-27

## 2021-04-25 ENCOUNTER — HEALTH MAINTENANCE LETTER (OUTPATIENT)
Age: 76
End: 2021-04-25

## 2021-10-09 ENCOUNTER — HEALTH MAINTENANCE LETTER (OUTPATIENT)
Age: 76
End: 2021-10-09

## 2022-03-26 ENCOUNTER — HEALTH MAINTENANCE LETTER (OUTPATIENT)
Age: 77
End: 2022-03-26

## 2022-05-21 ENCOUNTER — HEALTH MAINTENANCE LETTER (OUTPATIENT)
Age: 77
End: 2022-05-21

## 2022-09-17 ENCOUNTER — HEALTH MAINTENANCE LETTER (OUTPATIENT)
Age: 77
End: 2022-09-17

## 2023-06-04 ENCOUNTER — HEALTH MAINTENANCE LETTER (OUTPATIENT)
Age: 78
End: 2023-06-04

## (undated) DEVICE — SUCTION SLEEVE NEPTUNE 2 125MM 0703-005-125

## (undated) DEVICE — SU ETHILON 4-0 PC-3 18" 1864G

## (undated) DEVICE — LINEN TOWEL PACK X30 5481

## (undated) DEVICE — ESU PENCIL SMOKE EVAC W/ROCKER SWITCH 0703-047-000

## (undated) DEVICE — NIM PROBE PRASS INCREMENTING TIP 8225825

## (undated) DEVICE — LINEN TOWEL PACK X6 WHITE 5487

## (undated) DEVICE — DRAPE SHEET REV FOLD 3/4 9349

## (undated) DEVICE — PREP POVIDONE IODINE SCRUB 7.5% 120ML

## (undated) DEVICE — SU SILK 2-0 TIE 12X30" A305H

## (undated) DEVICE — RETR ELASTIC STAYS LONE STAR BLUNT DUAL LEAD 3550-1G

## (undated) DEVICE — PACK NEURO MINOR UMMC SNE32MNMU4

## (undated) DEVICE — SYR EAR BULB 3OZ 0035830

## (undated) DEVICE — NIM ELEC SUBDERMAL NDL 3PAIR/BOX

## (undated) DEVICE — LABEL MEDICATION SYSTEM 3303-P

## (undated) DEVICE — ESU GROUND PAD ADULT W/CORD E7507

## (undated) DEVICE — SOL NACL 0.9% IRRIG 1000ML BOTTLE 2F7124

## (undated) DEVICE — SOL WATER IRRIG 1000ML BOTTLE 2F7114

## (undated) DEVICE — BLADE KNIFE SURG 15 371115

## (undated) DEVICE — SU MONOCRYL 4-0 PS-2 27" UND Y426H

## (undated) DEVICE — PREP POVIDONE IODINE SOLUTION 10% 120ML

## (undated) DEVICE — ESU CORD BIPOLAR AND IRR TUBING AESCULAP US355

## (undated) DEVICE — PREP SKIN SCRUB TRAY 4461A

## (undated) DEVICE — SU SILK 4-0 TIE 12X30" A303H

## (undated) DEVICE — SU SILK 3-0 TIE 12X30" A304H

## (undated) DEVICE — SPONGE KITTNER 30-101

## (undated) DEVICE — SU SILK 0 TIE 6X30" A306H

## (undated) DEVICE — SU SILK 2-0 SH CR 5X18" C0125

## (undated) DEVICE — SU VICRYL 3-0 SH 8X18" UND J864D

## (undated) DEVICE — SUCTION MANIFOLD DORNOCH ULTRA CART UL-CL500

## (undated) RX ORDER — ROCURONIUM BROMIDE 50 MG/5 ML
SYRINGE (ML) INTRAVENOUS
Status: DISPENSED
Start: 2017-11-16

## (undated) RX ORDER — PROPOFOL 10 MG/ML
INJECTION, EMULSION INTRAVENOUS
Status: DISPENSED
Start: 2017-11-16

## (undated) RX ORDER — FENTANYL CITRATE 50 UG/ML
INJECTION, SOLUTION INTRAMUSCULAR; INTRAVENOUS
Status: DISPENSED
Start: 2017-11-16

## (undated) RX ORDER — HYDROMORPHONE HYDROCHLORIDE 1 MG/ML
INJECTION, SOLUTION INTRAMUSCULAR; INTRAVENOUS; SUBCUTANEOUS
Status: DISPENSED
Start: 2017-11-16

## (undated) RX ORDER — CEFAZOLIN SODIUM 2 G/100ML
INJECTION, SOLUTION INTRAVENOUS
Status: DISPENSED
Start: 2017-11-16

## (undated) RX ORDER — LABETALOL HYDROCHLORIDE 5 MG/ML
INJECTION, SOLUTION INTRAVENOUS
Status: DISPENSED
Start: 2017-11-16

## (undated) RX ORDER — ACETAMINOPHEN 325 MG/1
TABLET ORAL
Status: DISPENSED
Start: 2017-11-16

## (undated) RX ORDER — DEXAMETHASONE SODIUM PHOSPHATE 4 MG/ML
INJECTION, SOLUTION INTRA-ARTICULAR; INTRALESIONAL; INTRAMUSCULAR; INTRAVENOUS; SOFT TISSUE
Status: DISPENSED
Start: 2017-11-16

## (undated) RX ORDER — EPHEDRINE SULFATE 50 MG/ML
INJECTION, SOLUTION INTRAMUSCULAR; INTRAVENOUS; SUBCUTANEOUS
Status: DISPENSED
Start: 2017-11-16

## (undated) RX ORDER — LIDOCAINE HYDROCHLORIDE 20 MG/ML
INJECTION, SOLUTION EPIDURAL; INFILTRATION; INTRACAUDAL; PERINEURAL
Status: DISPENSED
Start: 2017-11-16

## (undated) RX ORDER — ONDANSETRON 2 MG/ML
INJECTION INTRAMUSCULAR; INTRAVENOUS
Status: DISPENSED
Start: 2017-11-16

## (undated) RX ORDER — BACITRACIN 500 [USP'U]/G
OINTMENT OPHTHALMIC
Status: DISPENSED
Start: 2017-11-16

## (undated) RX ORDER — PHENYLEPHRINE HCL IN 0.9% NACL 1 MG/10 ML
SYRINGE (ML) INTRAVENOUS
Status: DISPENSED
Start: 2017-11-16

## (undated) RX ORDER — SODIUM CHLORIDE 9 MG/ML
INJECTION, SOLUTION INTRAVENOUS
Status: DISPENSED
Start: 2017-11-16

## (undated) RX ORDER — GLYCOPYRROLATE 0.2 MG/ML
INJECTION, SOLUTION INTRAMUSCULAR; INTRAVENOUS
Status: DISPENSED
Start: 2017-11-16